# Patient Record
Sex: MALE | Race: WHITE
[De-identification: names, ages, dates, MRNs, and addresses within clinical notes are randomized per-mention and may not be internally consistent; named-entity substitution may affect disease eponyms.]

---

## 2020-11-23 ENCOUNTER — HOSPITAL ENCOUNTER (EMERGENCY)
Dept: HOSPITAL 47 - EC | Age: 65
Discharge: HOME | End: 2020-11-23
Payer: COMMERCIAL

## 2020-11-23 VITALS — SYSTOLIC BLOOD PRESSURE: 140 MMHG | DIASTOLIC BLOOD PRESSURE: 89 MMHG | HEART RATE: 82 BPM | TEMPERATURE: 98.1 F

## 2020-11-23 VITALS — RESPIRATION RATE: 18 BRPM
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
## 2020-11-23 DIAGNOSIS — Z91.048: ICD-10-CM

## 2020-11-23 DIAGNOSIS — Z87.891: ICD-10-CM

## 2020-11-23 DIAGNOSIS — S43.004A: Primary | ICD-10-CM

## 2020-11-23 PROCEDURE — 73020 X-RAY EXAM OF SHOULDER: CPT

## 2020-11-23 PROCEDURE — 96372 THER/PROPH/DIAG INJ SC/IM: CPT

## 2020-11-23 PROCEDURE — 23650 CLTX SHO DSLC W/MNPJ WO ANES: CPT

## 2020-11-23 PROCEDURE — 99283 EMERGENCY DEPT VISIT LOW MDM: CPT

## 2020-11-23 PROCEDURE — 71046 X-RAY EXAM CHEST 2 VIEWS: CPT

## 2020-11-23 PROCEDURE — 96374 THER/PROPH/DIAG INJ IV PUSH: CPT

## 2020-11-23 PROCEDURE — 96375 TX/PRO/DX INJ NEW DRUG ADDON: CPT

## 2020-11-23 NOTE — ED
Upper Extremity HPI





<Neptali Quick - Last Filed: 11/23/20 12:05>





- General


Source: patient


Mode of arrival: wheelchair


Limitations: physical limitation





<Nelly Villarreal - Last Filed: 11/23/20 14:02>





- General


Chief Complaint: Extremity Injury, Upper


Stated Complaint: Fall/Shoulder injury


Time Seen by Provider: 11/23/20 09:27





- History of Present Illness


Initial Comments: 


64-year-old male presenting today for chief complaint of fall from truck bed.  

Patient states his truck but was slick and he slipped falling onto his shoulder.

 He states he fell directly onto the shoulder he states he did not hit his head 

he denies any neck pain back pain chest pain shortness of breath.  The based 

aspiration abdominal or flank pain he denies injury to the hip or lower 

extremities.  He states he only thing that hurts is his right shoulder he states

he felt like the top of the shoulder he denies any posterior shoulder pain. 

Patietn denies numbness, or weakness distal to the shoulder. Patient denies 

additional complaints. 


 (Nelly Villarreal)





- Related Data


                                Home Medications











 Medication  Instructions  Recorded  Confirmed


 


No Known Home Medications  11/23/20 11/23/20











                                    Allergies











Allergy/AdvReac Type Severity Reaction Status Date / Time


 


wool Allergy  Rash/Hives Verified 11/23/20 10:47














Review of Systems


ROS Other: All systems not noted in ROS Statement are negative.





<Neptali Quick - Last Filed: 11/23/20 12:05>


ROS Other: All systems not noted in ROS Statement are negative.





<Nelly Villarreal - Last Filed: 11/23/20 14:02>


ROS Statement: 


Those systems with pertinent positive or pertinent negative responses have been 

documented in the HPI.








Past Medical History


Past Medical History: No Reported History


History of Any Multi-Drug Resistant Organisms: None Reported


Past Surgical History: Orthopedic Surgery


Past Psychological History: No Psychological Hx Reported


Smoking Status: Former smoker


Past Alcohol Use History: None Reported


Past Drug Use History: None Reported





<Nelly Villarreal - Last Filed: 11/23/20 14:02>





General Exam


Limitations: physical limitation





<Nelly Villarreal - Last Filed: 11/23/20 14:02>





- General Exam Comments


Initial Comments: 


General:  The patient is awake and alert, in no distress 


Eye:  +3 mm pupils are equal, round and reactive to light, extra-ocular 

movements are intact.  No nystagmus.  There is normal conjunctiva bilaterally.  

No signs of icterus.  


Ears, nose, mouth and throat:  There are moist mucous membranes and no oral 

lesions.  No raccoon or Clark sign


Neck:  The neck is supple, there is no tenderness or JVD.  No midline tenderness

with patient the cervical spine


Cardiovascular:  There is a regular rate and rhythm. No murmur, rub or gallop is

appreciated.


Respiratory:  Lungs are clear to auscultation, respirations are non-labored, 

breath sounds are equal.  No wheezes, stridor, rales, or rhonchi.


Gastrointestinal:  Soft, non-distended, non-tender abdomen without masses or 

organomegaly noted. There is no rebound or guarding present.


Musculoskeletal: Gross inspection of the shoulder there is obvious anterior 

deformity.  Patient unable to range at the right shoulder.  No abrasions 

lacerations.  Patient is able to move fully at the right elbow wrist he can make

the okay fingers crossed and thumbs up sign.  Patient can oppose the small digit

and thumb.  No badge anesthesia.  Patient has sensation proximal distal to 

injury site   Strength 5/5 of elbow wrist. Radial pulses equal bilaterally 2+.  


Neurological:  A&O x 3. CN II-XII intact grossly, There are no obvious motor or 

sensory deficits. Coordination appears grossly intact. Speech is normal.


Skin:  Skin is warm and dry and no rashes or lesions are noted. 


Psychiatric:  Cooperative, appropriate mood & affect, normal judgment.  


 (Nelly Villarreal)





Course


                                   Vital Signs











  11/23/20 11/23/20 11/23/20





  09:06 12:00 12:01


 


Temperature 97.9 F  


 


Pulse Rate 79 75 72


 


Respiratory 18 18 18





Rate   


 


Blood Pressure 133/62 142/76 125/85


 


O2 Sat by Pulse 97 100 98





Oximetry   














  11/23/20 11/23/20 11/23/20





  12:05 12:20 12:35


 


Temperature   


 


Pulse Rate 78 72 73


 


Respiratory 18 18 18





Rate   


 


Blood Pressure 118/78 118/78 144/87


 


O2 Sat by Pulse 98 95 94 L





Oximetry   














  11/23/20 11/23/20





  12:50 13:30


 


Temperature 98.2 F 98.1 F


 


Pulse Rate 75 82


 


Respiratory 18 18





Rate  


 


Blood Pressure 140/86 140/89


 


O2 Sat by Pulse 94 L 96





Oximetry  














Procedures





- Procedural Sedation


Procedural Sedation Start Time: 12:00


Procedural Sedation Stop Time: 12:05


Indications: fracture/dislocation reduction


ASA Class: II


Mallampati Airway Score: 2


Preparation: cardiac monitor applied, pulse oximeter, capnometry used, 

supplemental O2 applied, suction/airway equipment at bedside


IV Propofol Dose (mgs): 100


Complications: none


Patient Tolerated Procedure: well





<Neptali Quick - Last Filed: 11/23/20 12:05>





Medical Decision Making





<Nelly Villarreal - Last Filed: 11/23/20 14:02>





- Medical Decision Making


anterior dislocation.  Neurovascularly intact both prior to and after reduction.

Patient was given 100 mg of propofol with respiratory bedside. No adverse event,

excess or reduction with my attending Dr. pain uterine who pushed the propofol. 

Patient states pain is much better. Monitored for 40 minutes after sedation. pt 

back to baseline discharged with PCPand orthopedic f/u





he denied any anticoagulation use


 (Nelly Villarreal)





Disposition





<Neptali Quick - Last Filed: 11/23/20 12:05>


Is patient prescribed a controlled substance at d/c from ED?: No


Time of Disposition: 12:30





<Nelly Villarreal - Last Filed: 11/23/20 14:02>


Clinical Impression: 


 Anterior dislocation of right shoulder, Fall





Disposition: HOME SELF-CARE


Condition: Good


Instructions (If sedation given, give patient instructions):  Shoulder 

Dislocation (ED), Moderate Sedation (ED)


Additional Instructions: 


Please use medication as discussed.  Please follow-up with family doctor in the 

next 2 days.  Please return to emergency room if the symptoms increase or worsen

or for any other concerns.


Referrals: 


None,Stated [Primary Care Provider] - 1-2 days


Mehul Martínez DO [Doctor of Osteopathic Medicine] - 1-2 days

## 2020-11-23 NOTE — XR
EXAMINATION TYPE: XR shoulder limited RT

 

DATE OF EXAM: 11/23/2020

 

COMPARISON: Earlier today

 

HISTORY: 64-year-old male postreduction exam

 

TECHNIQUE: Single AP view

 

FINDINGS: Mild degenerative change at the AC joint. Interval satisfactory reduction at the glenohumer
al joint. Bony irregularity superolaterally at the humeral head suggests a shallow Hill-Sachs deformi
ty.

 

IMPRESSION: 

Interval satisfactory reduction at the glenohumeral joint. Suspect a shallow Hill-Sachs impaction def
ormitsandra.

## 2020-11-23 NOTE — XR
EXAMINATION TYPE: XR chest 2V

 

DATE OF EXAM: 11/23/2020

 

COMPARISON: NONE

 

HISTORY: Shortness of breath

 

TECHNIQUE:  Frontal and lateral views of the chest are obtained.

 

FINDINGS:

 

Scattered senescent parenchymal changes noted. Hyperinflation compatible with COPD. 

 

No evidence for infiltrate. No evidence for atelectasis.

 

Heart size is stable.

 

Mediastinal structures are stable and grossly unremarkable.

 

No evidence for hilar prominence.

 

Degenerative changes dorsal spine. 

 

IMPRESSION:

1. No evidence for acute pulmonary disease.

## 2020-11-23 NOTE — XR
EXAMINATION TYPE: XR shoulder limited RT

 

DATE OF EXAM: 11/23/2020

 

CLINICAL HISTORY: pain

 

TECHNIQUE:  Two views of the right shoulder are obtained.

 

COMPARISON: None 

 

FINDINGS: Anterior dislocation of the humeral head relative to the glenoid. No obvious fracture ident
ified.

 

IMPRESSION: 

 

1. Anterior shoulder dislocation.